# Patient Record
(demographics unavailable — no encounter records)

---

## 2025-04-26 NOTE — ASSESSMENT
[FreeTextEntry1] : 13-year-old male here accompanied by his mother presents today for evaluation for a right thumb injury.  Patient reports he banged his right thumb on a wall when he was playing with his friends 2 days ago.  [facility] with a displaced fracture as per the [facility] report.  He was placed in a splint and advised follow-up with an orthopedic specialist.  X-rays of the right thumb taken in the office today: No obvious acute fracture, subluxation, or dislocation.  Physical examination of the right thumb: Mild swelling appreciated over the proximal phalanx and MP joint.  Can fully flex and extend all joints.  No extensor lag.  No deformity.  No malrotation.  No deviation.  No instability.  Sensation intact distally.  Neuro vas intact.  TX: Discussed with patient and mother that I disagree with the x-ray reports from [facility] as there are no obvious fractures seen here on x-rays in the office today.  Patient likely has a contusion given the mechanism of injury.  I placed him in a right thumb spica brace in the office today that which she will utilize at all times at least until repeat evaluation.  Encouraged no gym or sports.  Activity modification.  Will see the patient back in 10 to 14 days for close follow-up and repeat evaluation/treatment

## 2025-05-12 NOTE — HISTORY OF PRESENT ILLNESS
[FreeTextEntry1] : 14 y/o male here with right thumb injury after banging it into a wall. Patient was splinted at [facility] after being advised of a fracture and was last seen by CORINNA Reyes and was placed in a thumb spica brace.

## 2025-05-12 NOTE — HISTORY OF PRESENT ILLNESS
[FreeTextEntry1] : 14 y/o male here with right thumb injury after banging it into a wall. Patient was splinted at [facility] after being advised of a fracture and was last seen by CORINNA Reyes and was placed in a thumb spica brace.

## 2025-05-12 NOTE — ASSESSMENT
[FreeTextEntry1] : Likely ligamentous injury. Continue wrist brace for one week. Follow up in 2 weeks to determine if he can return to gym/sports.

## 2025-05-27 NOTE — HISTORY OF PRESENT ILLNESS
[FreeTextEntry1] : 14 y/o male here today with right thumb ligamentous injury after banging it into a wall. Patient has been wearing a wrist brace on and off patient was supposed to return to clinic earlier but says he was not able to due to being busy

## 2025-05-27 NOTE — HISTORY OF PRESENT ILLNESS
[FreeTextEntry1] : 12 y/o male here today with right thumb ligamentous injury after banging it into a wall. Patient has been wearing a wrist brace on and off patient was supposed to return to clinic earlier but says he was not able to due to being busy

## 2025-05-27 NOTE — PHYSICAL EXAM
[Not Examined] : not examined [Normal] : The patient is moving all extremities spontaneously without any gross neurologic deficits. They walk with a fluid nonantalgic gait. There are equal and symmetric deep tendon reflexes in the upper and lower extremities bilaterally. There is gross intact sensation to soft and light touch in the bilateral upper and lower extremities [Musculoskeletal All Normal] : normal gait for age, good posture, normal clinical alignment in upper and lower extremities, normal clinical alignment of the spine, full range of motion in bilateral upper and lower extremities [de-identified] : islt  intact motor

## 2025-05-27 NOTE — ASSESSMENT
[FreeTextEntry1] : discussed importance of wearing brace and that if he stops wearing brace or spica cast that he may need surgery patient aware will follow up